# Patient Record
Sex: MALE | Race: NATIVE HAWAIIAN OR OTHER PACIFIC ISLANDER | HISPANIC OR LATINO | URBAN - METROPOLITAN AREA
[De-identification: names, ages, dates, MRNs, and addresses within clinical notes are randomized per-mention and may not be internally consistent; named-entity substitution may affect disease eponyms.]

---

## 2018-06-16 ENCOUNTER — HOSPITAL ENCOUNTER (OUTPATIENT)
Dept: RADIOLOGY | Facility: MEDICAL CENTER | Age: 38
End: 2018-06-16
Attending: PHYSICIAN ASSISTANT
Payer: COMMERCIAL

## 2018-06-16 ENCOUNTER — OFFICE VISIT (OUTPATIENT)
Dept: URGENT CARE | Facility: PHYSICIAN GROUP | Age: 38
End: 2018-06-16
Payer: COMMERCIAL

## 2018-06-16 VITALS
SYSTOLIC BLOOD PRESSURE: 124 MMHG | BODY MASS INDEX: 29.16 KG/M2 | TEMPERATURE: 99 F | OXYGEN SATURATION: 97 % | HEART RATE: 81 BPM | WEIGHT: 220 LBS | DIASTOLIC BLOOD PRESSURE: 86 MMHG | HEIGHT: 73 IN

## 2018-06-16 DIAGNOSIS — S69.91XA INJURY OF RIGHT HAND, INITIAL ENCOUNTER: ICD-10-CM

## 2018-06-16 DIAGNOSIS — S62.390A CLOSED DISPLACED FRACTURE OF OTHER PART OF SECOND METACARPAL BONE OF RIGHT HAND, INITIAL ENCOUNTER: ICD-10-CM

## 2018-06-16 PROCEDURE — 73130 X-RAY EXAM OF HAND: CPT | Mod: RT

## 2018-06-16 PROCEDURE — 99203 OFFICE O/P NEW LOW 30 MIN: CPT | Performed by: PHYSICIAN ASSISTANT

## 2018-06-16 RX ORDER — IBUPROFEN 200 MG
200 TABLET ORAL EVERY 6 HOURS PRN
COMMUNITY

## 2018-06-16 ASSESSMENT — ENCOUNTER SYMPTOMS
VOMITING: 0
ABDOMINAL PAIN: 0
NUMBNESS: 1
WEAKNESS: 1
NAUSEA: 0
CHILLS: 0
FEVER: 0
DIZZINESS: 0
DIARRHEA: 0
SHORTNESS OF BREATH: 0

## 2018-06-16 ASSESSMENT — PAIN SCALES - GENERAL: PAINLEVEL: 8=MODERATE-SEVERE PAIN

## 2018-06-16 NOTE — PROGRESS NOTES
"Subjective:      Ganesh Arguelles is a 38 y.o. male who presents with Hand Injury (x4 days. Pt injured Rt hand at the gym. Swelling, loss of movement. Loss of fine touch sensation.)            Hand Injury   This is a new problem. The current episode started in the past 7 days (4 days). The problem occurs constantly. The problem has been unchanged. Associated symptoms include numbness and weakness. Pertinent negatives include no abdominal pain, chest pain, chills, congestion, fever, nausea, rash or vomiting. Exacerbated by: movement. He has tried NSAIDs for the symptoms. The treatment provided mild relief.     Patient presents to urgent care reporting a 4 day history of right hand pain and swelling following an incident at the gym where he punched a punching bag. He also reports numbness over the 2nd metacarpal area. He is right hand dominant. No previous hand injuries.     Review of Systems   Constitutional: Negative for chills and fever.   HENT: Negative for congestion.    Respiratory: Negative for shortness of breath.    Cardiovascular: Negative for chest pain.   Gastrointestinal: Negative for abdominal pain, diarrhea, nausea and vomiting.   Genitourinary: Negative.    Musculoskeletal:        + right hand pain   Skin: Negative for rash.   Neurological: Positive for weakness and numbness. Negative for dizziness.        + numbness        Objective:     /86   Pulse 81   Temp 37.2 °C (99 °F)   Ht 1.854 m (6' 1\")   Wt 99.8 kg (220 lb)   SpO2 97%   BMI 29.03 kg/m²      Physical Exam   Constitutional: He is oriented to person, place, and time. He appears well-developed and well-nourished. No distress.   HENT:   Head: Normocephalic and atraumatic.   Eyes: Pupils are equal, round, and reactive to light.   Neck: Normal range of motion.   Cardiovascular: Normal rate.    Pulmonary/Chest: Effort normal.   Musculoskeletal:        Right hand: He exhibits decreased range of motion, tenderness and bony " tenderness. Decreased sensation noted. Decreased strength noted.        Hands:  Significant amount of edema and ecchymosis over right MTP joint. Decreased ROM and +TTP. Decreased sensation over dorsal aspect of lateral hand.     Neurological: He is alert and oriented to person, place, and time.   Skin: Skin is warm and dry. He is not diaphoretic.   Psychiatric: He has a normal mood and affect. His behavior is normal.   Nursing note and vitals reviewed.         PMH:  has no past medical history on file.  MEDS:   Current Outpatient Prescriptions:   •  ibuprofen (MOTRIN) 200 MG Tab, Take 200 mg by mouth every 6 hours as needed., Disp: , Rfl:   ALLERGIES: No Known Allergies  SURGHX: History reviewed. No pertinent surgical history.  SOCHX:  reports that he has never smoked. He has never used smokeless tobacco.  FH: family history is not on file.       Assessment/Plan:     1. Closed displaced fracture of other part of second metacarpal bone of right hand, initial encounter  - DX-HAND 3+ RIGHT; Future  Impression       Comminuted mildly displaced fracture of the second metacarpal head.     - REFERRAL TO ORTHOPEDICS    Patient placed in Jo-Ann-type splint. He declines pain medication at today's visit, encouraged to take nsaids as needed for pain. He will follow up with ortho in 2-3 days for further evaluation and management. The patient demonstrated a good understanding and agreed with the treatment plan.

## 2018-08-19 ENCOUNTER — APPOINTMENT (OUTPATIENT)
Dept: RADIOLOGY | Facility: MEDICAL CENTER | Age: 38
End: 2018-08-19
Payer: COMMERCIAL

## 2018-08-19 ENCOUNTER — HOSPITAL ENCOUNTER (EMERGENCY)
Facility: MEDICAL CENTER | Age: 38
End: 2018-08-20
Attending: EMERGENCY MEDICINE
Payer: COMMERCIAL

## 2018-08-19 VITALS — WEIGHT: 225 LBS | BODY MASS INDEX: 32.21 KG/M2 | HEIGHT: 70 IN

## 2018-08-19 DIAGNOSIS — I46.9 CARDIAC ARREST (HCC): ICD-10-CM
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
## 2018-08-19 LAB
BARCODED ABORH UBTYP: 5100
BARCODED PRD CODE UBPRD: NORMAL
BARCODED UNIT NUM UBUNT: NORMAL
COMPONENT R 8504R: NORMAL
PRODUCT TYPE UPROD: NORMAL
UNIT STATUS USTAT: NORMAL

## 2018-08-19 PROCEDURE — 305949 HCHG RED TRAUMA ACT PRE-NOTIFY NO CC

## 2018-08-19 PROCEDURE — 92950 HEART/LUNG RESUSCITATION CPR: CPT

## 2018-08-19 PROCEDURE — 302214 INTUBATION BOX: Performed by: EMERGENCY MEDICINE

## 2018-08-19 PROCEDURE — P9016 RBC LEUKOCYTES REDUCED: HCPCS

## 2018-08-19 PROCEDURE — 36430 TRANSFUSION BLD/BLD COMPNT: CPT

## 2018-08-19 PROCEDURE — 99285 EMERGENCY DEPT VISIT HI MDM: CPT

## 2018-08-20 NOTE — ED NOTES
Cardiac ultrasound performed.     TOD 2254. Pronounced by Trauma/Surgery Dr. Romero and ERP Dr. Hill.
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
## 2018-08-20 NOTE — ED NOTES
[facility]     CPR in progress since 2228. PEA for [facility]. 1 round epinephrine given. R tib IO in place.